# Patient Record
Sex: FEMALE | Race: BLACK OR AFRICAN AMERICAN
[De-identification: names, ages, dates, MRNs, and addresses within clinical notes are randomized per-mention and may not be internally consistent; named-entity substitution may affect disease eponyms.]

---

## 2019-07-16 NOTE — NUR
ED Nurse Note:



pt walked in due to rash on the left leg, pt stated it came from a spider bite 
but denies seing spider. seen by kylie. will continue to monitor.

## 2019-07-16 NOTE — EMERGENCY ROOM REPORT
History of Present Illness


General


Chief Complaint:  Skin Rash/Abscess


Source:  Patient, Family Member





Present Illness


HPI


89-year-old female with history of diet-controlled hypertension presents with 

itchy red blisters on her legs that have been going on for about a week and half

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 

similar problems recently.  She denies fevers, reports they do hurt a little bit

, and some have blistered up.  Not tried any medications for symptoms.


Allergies:  


Coded Allergies:  


     No Known Allergies (Unverified , 7/16/19)





Patient History


Past Medical History:  see triage record


Reviewed Nursing Documentation:  PMH: Agreed; PSxH: Agreed





Nursing Documentation-PMH


Past Medical History:  No History, Except For





Review of Systems


All Other Systems:  negative except mentioned in HPI





Physical Exam





Vital Signs








  Date Time  Temp Pulse Resp B/P (MAP) Pulse Ox O2 Delivery O2 Flow Rate FiO2


 


7/16/19 11:05 97.9 72 19 140/94 (109) 97 Room Air  








Sp02 EP Interpretation:  reviewed, normal


General Appearance:  no apparent distress, alert, non-toxic


Head:  normocephalic


Eyes:  bilateral eye normal inspection, bilateral eye PERRL, bilateral eye EOMI


ENT:  normal ENT inspection, hearing grossly normal, normal pharynx, no 

angioedema, normal voice, moist mucus membranes


Neck:  normal inspection, full range of motion, supple, supple/symm/no masses


Respiratory:  chest non-tender, lungs clear, normal breath sounds, chest 

symmetrical, palpation of chest normal


Cardiovascular #1:  normal peripheral pulses, regular rate, rhythm


Cardiovascular #2:  2+ radial (R), 2+ radial (L)


Gastrointestinal:  normal inspection, non tender, soft, no mass, no guarding, 

no rebound


Rectal:  deferred


Genitourinary:  normal inspection, no CVA tenderness


Musculoskeletal:  back normal, gait/station normal, normal range of motion, non-

tender, no calf tenderness, Vicente's Sign negative


Neurologic:  alert, responsive, CNs III-XII nml as tested, motor strength/tone 

normal, sensory intact, speech normal


Psychiatric:  judgement/insight normal, memory normal, mood/affect normal


Skin:  other - Few, 5-10, quarter centimeter red raised pruritic spots on 

bilateral lower extremities, 2 on left leg have 1 cm blistering


Lymphatic:  no adenopathy





Medical Decision Making


Diagnostic Impression:  


 Primary Impression:  


 Bug bite


ER Course


She was localized reaction to bug bite, suspect possible early infection 

especially on the one that is blistered, however it is not an abscess there is 

just some amount of erythema around it, will discharge with Benadryl, Medrol 

Dosepak, Keflex.





Last Vital Signs








  Date Time  Temp Pulse Resp B/P (MAP) Pulse Ox O2 Delivery O2 Flow Rate FiO2


 


7/16/19 11:05 97.9 72 19 140/94 (109) 97 Room Air  








Disposition:  HOME, SELF-CARE


Condition:  Stable











RUBIN MYERS M.D Jul 16, 2019 11:19

## 2019-11-29 ENCOUNTER — HOSPITAL ENCOUNTER (EMERGENCY)
Dept: HOSPITAL 72 - EMR | Age: 49
Discharge: HOME | End: 2019-11-29
Payer: COMMERCIAL

## 2019-11-29 VITALS — WEIGHT: 200 LBS | HEIGHT: 67 IN | BODY MASS INDEX: 31.39 KG/M2

## 2019-11-29 VITALS — SYSTOLIC BLOOD PRESSURE: 141 MMHG | DIASTOLIC BLOOD PRESSURE: 86 MMHG

## 2019-11-29 VITALS — DIASTOLIC BLOOD PRESSURE: 86 MMHG | SYSTOLIC BLOOD PRESSURE: 141 MMHG

## 2019-11-29 DIAGNOSIS — Z76.0: Primary | ICD-10-CM

## 2019-11-29 DIAGNOSIS — I10: ICD-10-CM

## 2019-11-29 PROCEDURE — 99282 EMERGENCY DEPT VISIT SF MDM: CPT
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
## 2020-04-17 ENCOUNTER — HOSPITAL ENCOUNTER (EMERGENCY)
Dept: HOSPITAL 72 - EMR | Age: 50
Discharge: HOME | End: 2020-04-17
Payer: MEDICAID

## 2020-04-17 VITALS — WEIGHT: 190 LBS | BODY MASS INDEX: 30.53 KG/M2 | HEIGHT: 66 IN

## 2020-04-17 VITALS — SYSTOLIC BLOOD PRESSURE: 136 MMHG | DIASTOLIC BLOOD PRESSURE: 83 MMHG

## 2020-04-17 VITALS — SYSTOLIC BLOOD PRESSURE: 130 MMHG | DIASTOLIC BLOOD PRESSURE: 84 MMHG

## 2020-04-17 DIAGNOSIS — R19.7: Primary | ICD-10-CM

## 2020-04-17 DIAGNOSIS — R10.30: ICD-10-CM

## 2020-04-17 DIAGNOSIS — I10: ICD-10-CM

## 2020-04-17 PROCEDURE — 99282 EMERGENCY DEPT VISIT SF MDM: CPT

## 2020-04-17 NOTE — NUR
ED Nurse Note:



pt presents to ED needing a note to return to work. pt reports that she has had 
diarrhea x4 days, and still ongoing today but has improved. she needs a work 
note for tuesday 4/14-4/17. pt denies any other complaints at this time

## 2020-04-17 NOTE — EMERGENCY ROOM REPORT
History of Present Illness


General


Chief Complaint:  Diarrhea


Source:  Patient





Present Illness


HPI


49-year-old female presents to the emergency department complaining of episodes 

of diarrhea x3 days.  Patient reports just prior to having a bowel movement she 

will experience 8 out of 10 in severity cramping burning lower abdominal pain 

that radiates across her abdomen.  Patient reports pain is relieved after bowel 

movement.  She denies fevers or chills.  She denies blood in the stool or black 

tarry stools.  Patient denies nausea or vomiting.  She denies contact with 

persons who have similar symptoms.  She denies recent travel.  She denies 

recent antibiotic use.  Patient denies pregnancy or suspicion of pregnancy and 

states that she has her tubes tied.  She denies abdominal tenderness or 

distention.  She denies dysuria, hematuria or urinary frequency.  No other 

aggravating or relieving factors at this time. Denies hx of Crohn's, 

diverticulitis or pUD.  she states she is just been drinking a lot of Gatorade 

and Pedialyte in an attempt to relieve her symptoms. She is requesting note for 

work as she is unable to go due to frequent BM's. She is not currently having 

pain at this time. Last Bm was 2 hours ago.


Allergies:  


Coded Allergies:  


     No Known Allergies (Unverified , 7/16/19)





COVID-19 Screening


Contact w/high risk pt:  No


Recent Travel to affected area:  No


Experienced COVID-19 symptoms?:  No





Patient History


Past Medical History:  see triage record


Past Surgical History:  none


Pertinent Family History:  none


Pregnant Now:  No


Reviewed Nursing Documentation:  PMH: Agreed; PSxH: Agreed





Nursing Documentation-PMH


Past Medical History:  No History, Except For


Hx Hypertension:  Yes





Review of Systems


All Other Systems:  negative except mentioned in HPI





Physical Exam





Vital Signs








  Date Time  Temp Pulse Resp B/P (MAP) Pulse Ox O2 Delivery O2 Flow Rate FiO2


 


4/17/20 15:04 98.1 78 17 136/83 (100) 98 Room Air  








Sp02 EP Interpretation:  reviewed, normal


General Appearance:  no apparent distress, alert, GCS 15, non-toxic


Head:  normocephalic, atraumatic


Eyes:  bilateral eye normal inspection, bilateral eye PERRL


ENT:  hearing grossly normal, normal voice


Neck:  full range of motion


Respiratory:  chest non-tender, lungs clear, normal breath sounds, speaking 

full sentences


Cardiovascular #1:  regular rate, rhythm


Gastrointestinal:  normal bowel sounds, non tender, soft, non-distended, no 

guarding


Rectal:  deferred


Genitourinary:  normal inspection, no CVA tenderness


Musculoskeletal:  back normal, normal range of motion, gait/station normal, non-

tender


Neurologic:  alert, motor strength/tone normal, oriented x3, sensory intact, 

responsive, speech normal


Psychiatric:  judgement/insight normal


Lymphatic:  no adenopathy





Medical Decision Making


PA Attestation


Dr. Walton is my supervising Physician whom patient management has been 

discussed with.


Diagnostic Impression:  


 Primary Impression:  


 Diarrhea


 Qualified Codes:  R19.7 - Diarrhea, unspecified


ER Course


49-year-old female presents to the emergency department complaining of episodes 

of diarrhea x3 days.  Patient reports just prior to having a bowel movement she 

will experience 8 out of 10 in severity cramping burning lower abdominal pain 

that radiates across her abdomen.  Patient reports pain is relieved after bowel 

movement.  She denies fevers or chills.  She denies blood in the stool or black 

tarry stools.  Patient denies nausea or vomiting.  She denies contact with 

persons who have similar symptoms.  She denies recent travel.  She denies 

recent antibiotic use.  Patient denies pregnancy or suspicion of pregnancy and 

states that she has her tubes tied.  She denies abdominal tenderness or 

distention.  She denies dysuria, hematuria or urinary frequency.  No other 

aggravating or relieving factors at this time. Denies hx of Crohn's, 

diverticulitis or pUD.  she states she is just been drinking a lot of Gatorade 

and Pedialyte in an attempt to relieve her symptoms. She is requesting note for 

work as she is unable to go due to frequent BM's. She is not currently having 

pain at this time. Last Bm was 2 hours ago.





Ddx considered but are not limited to GE, colitis, acute appy, SBO, Cyclical 

Vomiting secondary to THC,  * Pregnancy





Vital signs:  pt. is afebrile,   


H&PE are most consistent with  GE most likely viral in etiology, no evidence to 

suggest acute abdomen on physical exam.





ORDERS: 





-None required at this time, the dx is clinical. 











ED INTERVENTIONS: 





-None required at this time. Pt. stable for outpatient symptomatic conservative 

treatment.








This  patient was evaluated in the context of the global COVID-19 pandemic, 

which necessitated consideration that the patient might be at risk for 

infection with the SARS-COV-2 virus that causes COVID-19.  Institutional 

protocols and algorithms that pertaining to the evaluation of patients at risk 

for COVID-19 are in a state of rapid change based on information released by 

multiple regulatory bodies including the CDC and federal and state 

organizations.  These policies and algorithms were followed during the patient'

s care in the emergency department








DISCHARGE: At this time pt. is stable for d/c to home. Will provide printed 

patient care instructions, and any necessary prescriptions. Care plan and 

follow up instructions have been discussed with the patient prior to discharge.





Last Vital Signs








  Date Time  Temp Pulse Resp B/P (MAP) Pulse Ox O2 Delivery O2 Flow Rate FiO2


 


4/17/20 15:19 98.1 87 17 136/83 98 Room Air  








Disposition:  HOME, SELF-CARE


Condition:  Stable


Scripts


Dicyclomine Hcl* (DICYCLOMINE HCL*) 10 Mg Capsule


10 MG ORAL TID for 4 Days, #12 CAP


   Prov: Mickie Villa         4/17/20


Referrals:  


[facility]





[place] [facility]


Departure Forms:  Return to Work      Return to Work Date:  Apr 21, 2020


   Other Restrictions:  May return Sooner if Symptoms have resolved. 


   Return to Full Activity:  Apr 21, 2020


   Work Restrictions:  None


Patient Instructions:  Diarrhea, Adult





Additional Instructions:  


Take medications as directed. 





 ** Follow up with a Primary Care Provider in 3-5 days, even if your symptoms 

have resolved. ** 


--Please review list of primary care clinics, if you do not already have a 

primary care provider





Return sooner to ED if new symptoms occur, or current symptoms become worse. 











- Please note that this Emergency Department Report was dictated using [employer] technology software, occasionally this can lead to 

erroneous entry secondary to interpretation by the dictation equipment.











Mickie Villa Apr 17, 2020 15:29

## 2020-05-17 ENCOUNTER — HOSPITAL ENCOUNTER (EMERGENCY)
Dept: HOSPITAL 72 - EMR | Age: 50
Discharge: HOME | End: 2020-05-17
Payer: COMMERCIAL

## 2020-05-17 VITALS — SYSTOLIC BLOOD PRESSURE: 155 MMHG | DIASTOLIC BLOOD PRESSURE: 92 MMHG

## 2020-05-17 VITALS — SYSTOLIC BLOOD PRESSURE: 160 MMHG | DIASTOLIC BLOOD PRESSURE: 98 MMHG

## 2020-05-17 VITALS — WEIGHT: 190 LBS | BODY MASS INDEX: 28.79 KG/M2 | HEIGHT: 68 IN

## 2020-05-17 DIAGNOSIS — I10: ICD-10-CM

## 2020-05-17 DIAGNOSIS — G89.29: ICD-10-CM

## 2020-05-17 DIAGNOSIS — M54.12: Primary | ICD-10-CM

## 2020-05-17 PROCEDURE — 96372 THER/PROPH/DIAG INJ SC/IM: CPT

## 2020-05-17 PROCEDURE — 99283 EMERGENCY DEPT VISIT LOW MDM: CPT
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
